# Patient Record
Sex: FEMALE
[De-identification: names, ages, dates, MRNs, and addresses within clinical notes are randomized per-mention and may not be internally consistent; named-entity substitution may affect disease eponyms.]

---

## 2018-01-25 ENCOUNTER — HOSPITAL ENCOUNTER (EMERGENCY)
Dept: HOSPITAL 68 - ERH | Age: 48
End: 2018-01-25
Payer: COMMERCIAL

## 2018-01-25 VITALS — BODY MASS INDEX: 36.32 KG/M2 | HEIGHT: 63 IN | WEIGHT: 205 LBS

## 2018-01-25 VITALS — SYSTOLIC BLOOD PRESSURE: 171 MMHG | DIASTOLIC BLOOD PRESSURE: 101 MMHG

## 2018-01-25 DIAGNOSIS — J10.1: Primary | ICD-10-CM

## 2018-01-25 NOTE — ED INFLUENZA/URI COMPLAINT
History of Present Illness
 
General
Chief Complaint: Upper Respiratory Sx/Fever
Stated Complaint: URI/FEVER
Source: patient
Exam Limitations: no limitations
 
Vital Signs & Intake/Output
Vital Signs & Intake/Output
 Vital Signs
 
 
Date Time Temp Pulse Resp B/P B/P Pulse O2 O2 Flow FiO2
 
     Mean Ox Delivery Rate 
 
01/25 1228 96.5 92 18 171/101  96 Room Air  
 
01/25 1120       Room Air  
 
01/25 1052 96.7 97 15 157/100  98 Room Air Room Air 
 
 
 
Allergies
Coded Allergies:
No Known Allergies (01/25/18)
 
Triage Note:
PT TO ED FOR C/C OF PRODUCTIVE YELLOW COUGH WITH
 SUBJECTIVE FEVERS RELIEVED WITH MOTRIN, +CHILLS
 AND BODY ACHES.  MID UPPER BACK PAIN THAT WORSE
 WITH DEEP BREATH. PT DID DRIVE HERE FROM [place] IN
 DECEMBER.  PT REPORTS GETTING SOB JUST TO TIE HER
 SHOE.
Triage Nurses Notes Reviewed? yes
Onset: Gradual
Duration: constant
Timing: recent history
Severity: severe
Severity Numbers: 7
HPI:
Patient is a 47-year-old female who presents to emergency room with a four-day 
history of body aches chills nasal congestion cough and tactile fevers.
No sick contacts at home
No smoking history
Denies any chest pain and arm pain jaw pain leg swelling hemoptysis
 
(Rocael Hong)
Reconcile Medications
Albuterol Sulfate (Ventolin Hfa) 90 MCG HFA.AER.AD   2 PUF INH Q4-6 PRN PRN 
SHORTNESS OF BREATH
Benzonatate (Tessalon Perle) 100 MG CAPSULE   1 CAP PO TID PRN COUGH
Codeine Phosphate/Guaifenesi (Cheratussin AC Syrup) 10 MG-100 MG/5 ML LIQUID   
10 ML PO BID PRN COUGH
     DO NOT OPERATE MOTOR VEHICLES WITH MEDICATION
Codeine Phosphate/Guaifenesi (Cheratussin AC Syrup) 10 MG-100 MG/5 ML LIQUID   
10 ML PO BID PRN COUGH
     DO NOT OPERATE MOTOR VEHICLES WITH MEDICATION
Fluticasone Propionate (Flonase Allergy Relief) 50 MCG/ACTUATION SPRAY.SUSP   2 
SPRAY RUSH DAILY PRN CONGESTION
 
(Hao Elkins DO)
 
Past History
 
Travel History
Traveled to [place] past 21 day No
 
Medical History
Any Pertinent Medical History? none
Neurological: NONE
EENT: NONE
Cardiovascular: NONE
Respiratory: NONE
Gastrointestinal: NONE
Hepatic: NONE
Renal: NONE
Musculoskeletal: NONE
Psychiatric: NONE
Endocrine: NONE
Blood Disorders: NONE
Cancer(s): NONE
GYN/Reproductive: NONE
 
Surgical History
Surgical History: non-contributory
 
Psychosocial History
What is your primary language English
Tobacco Use: Never used
ETOH Use: denies use
Illicit Drug Use: denies illicit drug use
 
Family History
Hx Contributory? No
(Rocael Hong)
 
Review of Systems
 
Review of Systems
Constitutional:
Reports: see HPI, chills, fever. 
EENTM:
Reports: see HPI, nasal congestion. 
Respiratory:
Reports: see HPI, cough. 
Cardiovascular:
Reports: no symptoms. 
GI:
Reports: no symptoms. 
Genitourinary:
Reports: no symptoms. 
Musculoskeletal:
Reports: see HPI, joint pain. 
Skin:
Reports: see HPI. 
Neurological/Psychological:
Reports: see HPI, headache. 
Hematologic/Endocrine:
Reports: no symptoms. 
Immunologic/Allergic:
Reports: no symptoms. 
All Other Systems: Reviewed and Negative
(Rocael Hong)
 
Physical Exam
 
Physical Exam
General Appearance: no apparent distress, mild distress
Head: atraumatic
Eyes:
Bilateral: normal appearance, PERRL, EOMI. 
Ears, Nose, Throat: moist mucous membrane, hearing grossly normal, Tympanic 
normal, pharynx normal, nasal congestion
Neck: normal inspection, supple
Respiratory: normal breath sounds, chest non-tender, no respiratory distress
Cardiovascular: regular rate/rhythm
Peripheral Pulses:
2+ radial (R)
Gastrointestinal: normal bowel sounds, soft, non-tender
Extremities: normal inspection, no edema
Neurologic/Psych: no motor/sensory deficits, awake, alert, oriented x 3, normal 
gait
Skin: intact, normal color, warm/dry
 
Core Measures
Sepsis Present: No
Sepsis Focused Exam Completed? No
(Rocael Hong)
 
Progress
Differential Diagnosis: influenza, meningitis, neutropenia, otitis, pneumonia, 
pharyngitis, sinusitis
Plan of Care:
 Orders
 
 
Procedure Date/time Status
 
RAPID VIRAL INFLUENZA A 01/25 1056 Complete
 
VIRAL CULTURE 01/25 1056 Active
 
 
 Laboratory Tests
 
 
 
01/25/18 1056:
Virus Culture Pending
 Microbiology
01/25 1100  NASOPHARYN: Influenza Virus A & B Rapid Smear - COMP
                INFLUENZA TYPE A
 
Patient on initial presentation was noted to have a dry cough patient was 
afebrile clear lungs auscultation and has positive influenza,
No respiratory distress
Patient will be treated for concerns of influenza A discussed disposition plan 
with patient who agrees and has no questions
Initial ED EKG: none
(Rocael Hong)
 
Departure
 
Departure
Disposition: HOME OR SELF CARE
Condition: Stable
Clinical Impression
Primary Impression: Influenza A
Additional Instructions:
As discussed begin drinking plenty of water for hydration, begin over-the-
counter ibuprofen for body aches begin Tylenol for fevers, begin a prescription 
for Cheratussin and Tessalon Perles for cough begin the prescription of Ventolin
for shortness of breath and Nasonex for nasal congestion, begin over-the-counter
Sudafed for congestion.  Prescriptions waiting at [facility].  If no 
better in 2 days follow-up with her primary care doctor.  If symptoms worsen 
return to emergency room.
Departure Forms:
Customer Survey
General Discharge Information
(Rocael Hong)
 
Departure
Prescriptions:
Current Visit Scripts
Benzonatate (Tessalon Perle) 1 CAP PO TID PRN COUGH 
     #15 CAP 
 
Codeine Phosphate/Guaifenesi (Cheratussin AC Syrup) 10 ML PO BID PRN COUGH 
     #100 ML 
     DO NOT OPERATE MOTOR VEHICLES WITH MEDICATION
 
Albuterol Sulfate (Ventolin Hfa) 2 PUF INH Q4-6 PRN PRN SHORTNESS OF BREATH 
     #1 INHAL 
 
Fluticasone Propionate (Flonase Allergy Relief) 2 SPRAY RUSH DAILY PRN CONGESTION
 
     #1 BOT 
 
Codeine Phosphate/Guaifenesi (Cheratussin AC Syrup) 10 ML PO BID PRN COUGH 
     #100 ML 
     DO NOT OPERATE MOTOR VEHICLES WITH MEDICATION
 
 
 
PA/NP Co-Sign Statement
Statement:
ED Attending supervision documentation-
 
[] I saw and evaluated the patient. I have also reviewed all the pertinent lab 
results and diagnostic results. I agree with the findings and the plan of care 
as documented in the PA's/NP's documentation. 
 
[X] I have reviewed the ED Record and agree with the PA's/NP's documentation.
 
[] Additions or exceptions (if any) to the PAs/NP's note and plan are 
summarized below:
[]
 
(Hao Elkins DO